# Patient Record
Sex: MALE | Race: BLACK OR AFRICAN AMERICAN | NOT HISPANIC OR LATINO | Employment: UNEMPLOYED | URBAN - METROPOLITAN AREA
[De-identification: names, ages, dates, MRNs, and addresses within clinical notes are randomized per-mention and may not be internally consistent; named-entity substitution may affect disease eponyms.]

---

## 2020-09-20 ENCOUNTER — HOSPITAL ENCOUNTER (EMERGENCY)
Facility: OTHER | Age: 40
Discharge: HOME | End: 2020-09-20
Attending: EMERGENCY MEDICINE

## 2020-09-20 VITALS
WEIGHT: 150 LBS | SYSTOLIC BLOOD PRESSURE: 121 MMHG | HEIGHT: 69 IN | TEMPERATURE: 99 F | DIASTOLIC BLOOD PRESSURE: 71 MMHG | BODY MASS INDEX: 22.22 KG/M2 | HEART RATE: 61 BPM | OXYGEN SATURATION: 98 % | RESPIRATION RATE: 18 BRPM

## 2020-09-20 DIAGNOSIS — N50.82 SCROTAL PAIN: ICD-10-CM

## 2020-09-20 DIAGNOSIS — N49.2 SCROTAL WALL ABSCESS: Primary | ICD-10-CM

## 2020-09-20 LAB
BACTERIA #/AREA URNS HPF: ABNORMAL /HPF
BILIRUB UR QL STRIP: NEGATIVE
CLARITY UR: ABNORMAL
COLOR UR: YELLOW
GLUCOSE UR QL STRIP: NEGATIVE
HGB UR QL STRIP: NEGATIVE
KETONES UR QL STRIP: NEGATIVE
LEUKOCYTE ESTERASE UR QL STRIP: NEGATIVE
MICROSCOPIC COMMENT: ABNORMAL
NITRITE UR QL STRIP: NEGATIVE
PH UR STRIP: 8 [PH] (ref 5–8)
PROT UR QL STRIP: NEGATIVE
RBC #/AREA URNS HPF: 2 /HPF (ref 0–4)
SP GR UR STRIP: 1.02 (ref 1–1.03)
URN SPEC COLLECT METH UR: ABNORMAL
UROBILINOGEN UR STRIP-ACNC: ABNORMAL EU/DL
WBC #/AREA URNS HPF: 1 /HPF (ref 0–5)

## 2020-09-20 PROCEDURE — 99284 EMERGENCY DEPT VISIT MOD MDM: CPT | Mod: 25

## 2020-09-20 PROCEDURE — 81000 URINALYSIS NONAUTO W/SCOPE: CPT

## 2020-09-20 PROCEDURE — 87491 CHLMYD TRACH DNA AMP PROBE: CPT

## 2020-09-20 PROCEDURE — 25000003 PHARM REV CODE 250: Performed by: NURSE PRACTITIONER

## 2020-09-20 RX ORDER — SULFAMETHOXAZOLE AND TRIMETHOPRIM 800; 160 MG/1; MG/1
1 TABLET ORAL
Status: COMPLETED | OUTPATIENT
Start: 2020-09-20 | End: 2020-09-20

## 2020-09-20 RX ORDER — SULFAMETHOXAZOLE AND TRIMETHOPRIM 800; 160 MG/1; MG/1
1 TABLET ORAL 2 TIMES DAILY
Qty: 14 TABLET | Refills: 0 | Status: SHIPPED | OUTPATIENT
Start: 2020-09-20 | End: 2020-09-27

## 2020-09-20 RX ORDER — MUPIROCIN 20 MG/G
OINTMENT TOPICAL 3 TIMES DAILY
Qty: 15 G | Refills: 0 | Status: SHIPPED | OUTPATIENT
Start: 2020-09-20

## 2020-09-20 RX ADMIN — BACITRACIN, NEOMYCIN, POLYMYXIN B 1 EACH: 400; 3.5; 5 OINTMENT TOPICAL at 11:09

## 2020-09-20 RX ADMIN — SULFAMETHOXAZOLE AND TRIMETHOPRIM 1 TABLET: 800; 160 TABLET ORAL at 11:09

## 2020-09-21 NOTE — ED NOTES
Pt presents to the ED w/ c/o pain to right scrotum that radiates up to the crease of right thigh.  Reports popping a pimple on right scrotum and pain began soon after.  Onset of pain was yesterday.  Denies dysuria, urinary symptoms, nausea, fever, chills, vomiting.

## 2020-09-21 NOTE — ED PROVIDER NOTES
"Encounter Date: 9/20/2020       History     Chief Complaint   Patient presents with    Male  Problem     started with pain to "private parts" when he walks, started yesterday     The patient is a 40 year-old male who presents to the ED complaining of pain to his genitals that began on Tuesday. He attributes the pain to a "boil" on his scrotum. No fever or chills. He denies history of abscess to scrotum in the past, but has experienced abscesses to his groin/lower abdomen in the past. He does not shave. Denies penile drainage, dysuria, and penile lesions. He reports that he attempted to drain the wound with some success, but reports that the swelling is firm to touch. No other treatment attempted prior to arrival.    The history is provided by the patient and the spouse.     Review of patient's allergies indicates:  No Known Allergies  History reviewed. No pertinent past medical history.  History reviewed. No pertinent surgical history.  History reviewed. No pertinent family history.  Social History     Tobacco Use    Smoking status: Current Every Day Smoker     Packs/day: 2.00    Smokeless tobacco: Never Used   Substance Use Topics    Alcohol use: Yes    Drug use: Yes     Types: Marijuana     Review of Systems   Constitutional: Negative for fever.   HENT: Negative for sore throat.    Respiratory: Negative for shortness of breath.    Cardiovascular: Negative for chest pain.   Gastrointestinal: Negative for nausea and vomiting.   Genitourinary: Negative for dysuria.   Musculoskeletal: Negative for back pain.   Skin: Negative for rash.        "Boil" to scrotum   Neurological: Negative for weakness.   Hematological: Does not bruise/bleed easily.       Physical Exam     Initial Vitals [09/20/20 2101]   BP Pulse Resp Temp SpO2   133/64 74 18 98.9 °F (37.2 °C) 100 %      MAP       --         Physical Exam    Nursing note and vitals reviewed.  Constitutional: He appears well-developed and well-nourished.  Non-toxic " appearance. No distress.   HENT:   Head: Normocephalic and atraumatic.   Right Ear: Hearing and abnromal external ear normal.   Left Ear: Hearing and abnormal external ear normal.   Nose: Nose abnormal.   Mouth/Throat: Mucous membranes are normal.   Eyes: Conjunctivae and EOM are normal.   Neck: Full passive range of motion without pain. Neck supple.   Cardiovascular: Normal rate and normal pulses.   Pulmonary/Chest: Effort normal. No respiratory distress.   Genitourinary:         Musculoskeletal: Normal range of motion.   Neurological: He is alert and oriented to person, place, and time. He has normal strength. Gait normal.   Skin: Skin is warm, dry and intact. Capillary refill takes less than 2 seconds. No rash noted.   Psychiatric: He has a normal mood and affect. His speech is normal and behavior is normal. Judgment and thought content normal. Cognition and memory are normal.             ED Course   Procedures  Labs Reviewed   URINALYSIS, REFLEX TO URINE CULTURE - Abnormal; Notable for the following components:       Result Value    Appearance, UA Cloudy (*)     Urobilinogen, UA 2.0-3.0 (*)     All other components within normal limits    Narrative:     Specimen Source->Urine   URINALYSIS MICROSCOPIC - Abnormal; Notable for the following components:    Bacteria Moderate (*)     All other components within normal limits    Narrative:     Specimen Source->Urine   C. TRACHOMATIS/N. GONORRHOEAE BY AMP DNA          Imaging Results          US Scrotum And Testicles (Final result)  Result time 09/20/20 22:50:19    Final result by Teresa Parker MD (09/20/20 22:50:19)                 Impression:      No intra testicular mass.    Abnormal right appearing inguinal lymph node.    Abnormal vascular area superior to the right testicle.  Findings may represent phlegmonous tissue, very early abscess or other etiology.  Follow-up.      Electronically signed by: Teresa Parker  Date:    09/20/2020  Time:    22:50              Narrative:    EXAMINATION:  TESTICULAR ULTRASOUND WITH DOPPLER ANALYSIS    CLINICAL HISTORY:  Scrotal pain    TECHNIQUE:  Real-time testicular ultrasound was performed. Color and pulse Doppler imaging was utilized.    COMPARISON:  None.    FINDINGS:  The right testicle is homogeneous in echotexture and normal in size measuring 3.7 x 2.5 x 2.9 cm. There are no intratesticular masses. There is normal color flow seen to the right testicle.    The right epididymis is unremarkable.    The left testicle is homogeneous in echotexture and normal in size measuring 4.1 x 2.5 x 2.4 cm. There is normal color flow seen to the left testicle.    The left epididymis is unremarkable.    Color Doppler imaging demonstrates  blood flow in both testes. Pulse imaging demonstrates arterial and venous  waveforms.    There is complex palpable area superior to the right testicle, which is increased in vascularity.  This area measures 3.2 x 1.6 x 2.5 cm.  There is a abnormal appearing right inguinal lymph node measuring 2.8 x 1.0 x 1.4 cm.                                 Medical Decision Making:   History:   Old Medical Records: I decided to obtain old medical records.  Clinical Tests:   Lab Tests: Ordered and Reviewed  Radiological Study: Ordered and Reviewed  ED Management:  This is an emergent evaluation of a 40-year-old male who presents to the ED complaining of scrotal pain.    Patient's physical exam is notable for a superficial, open sore to his right posterior scrotum with surrounding erythema and induration.  No active drainage at this time.  No fluctuance.  Patient is afebrile with stable vital signs.    No significant evidence of infection on UA.  GC/CT pending.    Ultrasound demonstrates no intra testicular mass. Abnormal right appearing inguinal lymph node. Abnormal vascular area superior to the right testicle.  Findings may represent phlegmonous tissue, very early abscess or other etiology.    Based on history and physical  exam, as well as diagnostic results, I considered but do not suspect hydrocele, epididymitis, UTI, STI, testicular torsion, or significant cellulitis requiring hospital admission at this time.  I suspect scrotal wall abscess with no indication for incision and drainage at this time.  Patient will be discharged with topical and oral antibiotics.  Ambulatory referral has been placed Urology for follow-up.  All questions answered.  Return precautions given.  Case discussed with supervising physician who agrees with the plan.                             Clinical Impression:     ICD-10-CM ICD-9-CM   1. Scrotal wall abscess  N49.2 608.4   2. Scrotal pain  N50.82 608.9                          ED Disposition Condition    Discharge Stable        ED Prescriptions     Medication Sig Dispense Start Date End Date Auth. Provider    mupirocin (BACTROBAN) 2 % ointment Apply topically 3 (three) times daily. 15 g 9/20/2020  Linnea Hummel NP    sulfamethoxazole-trimethoprim 800-160mg (BACTRIM DS) 800-160 mg Tab Take 1 tablet by mouth 2 (two) times daily. for 7 days 14 tablet 9/20/2020 9/27/2020 Linnea Hummel NP        Follow-up Information     Follow up With Specialties Details Why Contact Info Additional Information    [facility] [address] Urology Schedule an appointment as soon as possible for a visit  For re-check with specialist [address], [address]  [facility] [address] [phone] Urology - [facility], 6th Floor Please park in the [address] and use [address] elevators    [facility]  If symptoms worsen [address] [address] [phone]                                        Linnea Hummel NP  09/21/20 5958

## 2020-09-21 NOTE — DISCHARGE INSTRUCTIONS
Thank you for allowing me to care for you today.  I hope our treatment plan will make you feel better in the next few days.  In order for me to take better care of my future patients and improve our Emergency Department, I would appreciate if you can provide us with feedback.  In the next few days, you may receive a survey in the mail.  If you do, it would mean a great deal to me if you would please take the time to complete it.    Thank you and I hope you feel better.  Linnea Hummel NP